# Patient Record
Sex: MALE | Race: WHITE | NOT HISPANIC OR LATINO | Employment: UNEMPLOYED | URBAN - METROPOLITAN AREA
[De-identification: names, ages, dates, MRNs, and addresses within clinical notes are randomized per-mention and may not be internally consistent; named-entity substitution may affect disease eponyms.]

---

## 2024-04-19 ENCOUNTER — OFFICE VISIT (OUTPATIENT)
Dept: URGENT CARE | Facility: CLINIC | Age: 34
End: 2024-04-19

## 2024-04-19 VITALS
RESPIRATION RATE: 18 BRPM | HEIGHT: 73 IN | HEART RATE: 65 BPM | WEIGHT: 175 LBS | OXYGEN SATURATION: 95 % | DIASTOLIC BLOOD PRESSURE: 92 MMHG | TEMPERATURE: 98 F | SYSTOLIC BLOOD PRESSURE: 132 MMHG | BODY MASS INDEX: 23.19 KG/M2

## 2024-04-19 DIAGNOSIS — S01.01XA LACERATION OF SCALP WITHOUT FOREIGN BODY, INITIAL ENCOUNTER: Primary | ICD-10-CM

## 2024-04-19 PROCEDURE — 99203 OFFICE O/P NEW LOW 30 MIN: CPT | Mod: TIER,S$GLB,,

## 2024-04-19 NOTE — PROCEDURES
Laceration Repair    Date/Time: 4/19/2024 2:45 PM    Performed by: Niki Dominguez NP  Authorized by: Niki Dominguez NP  Body area: head/neck  Location details: scalp  Laceration length: 3 cm  Foreign bodies: no foreign bodies  Tendon involvement: none  Nerve involvement: none  Irrigation solution: saline (hydrogen peroxide)  Irrigation method: syringe  Amount of cleaning: standard  Debridement: none  Skin closure: glue  Approximation: close  Patient tolerance: Patient tolerated the procedure well with no immediate complications        
20-Mar-2020 17:22

## 2024-04-19 NOTE — PROGRESS NOTES
"Subjective:      Patient ID: Kerwin Perez is a 33 y.o. male.    Vitals:  height is 6' 1" (1.854 m) and weight is 79.4 kg (175 lb). His temperature is 98.4 °F (36.9 °C). His blood pressure is 132/92 (abnormal) and his pulse is 65. His respiration is 18 and oxygen saturation is 95%.     Chief Complaint: Laceration    Patient presents to the clinic with a head laceration from a fall x today at a skateboarding event.    Provider note:    32 yo M presents with a laceration to his head after hitting his head on a tv above him while skateboarding at an event today.  Denies LOC, NVD, headache, visual changes. Pain is minimal. The accident happened 1 hour ago.     Laceration   The incident occurred 1 to 3 hours ago. The laceration is located on the Scalp. The laceration is 5 cm in size. Injury mechanism: edge of a TV. The pain is at a severity of 2/10. The pain is mild. The pain has been Constant since onset. His tetanus status is out of date.       Skin:  Positive for laceration.      Objective:     Physical Exam   Constitutional: He is oriented to person, place, and time. normal  HENT:   Head: Normocephalic and atraumatic.   Eyes: Conjunctivae are normal. Pupils are equal, round, and reactive to light. Extraocular movement intact   Abdominal: Normal appearance.   Musculoskeletal: Normal range of motion.         General: Normal range of motion.   Neurological: no focal deficit. He is alert and oriented to person, place, and time.   Skin: Skin is warm.         Comments: 3cm, superficial laceration noted to scalp. Skin edges are well approximated, bleeding is minimal.   Psychiatric: His behavior is normal. Mood, judgment and thought content normal.       Assessment:     1. Laceration of scalp without foreign body, initial encounter        Plan:   Wound irrigated with saline and hydrogen peroxide. Dermabond applied. Skin edges well approximated.     Laceration of scalp without foreign body, initial encounter  -     " Cancel: Laceration Repair      Please return here or go to the Emergency Department for any concerns or worsening of condition.  Please follow up with your primary care doctor or specialist as needed.    If you  smoke, please stop smoking.